# Patient Record
Sex: MALE
[De-identification: names, ages, dates, MRNs, and addresses within clinical notes are randomized per-mention and may not be internally consistent; named-entity substitution may affect disease eponyms.]

---

## 2018-10-12 ENCOUNTER — HOSPITAL ENCOUNTER (EMERGENCY)
Dept: HOSPITAL 90 - EDH | Age: 43
Discharge: HOME | End: 2018-10-12
Payer: SELF-PAY
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
## 2018-10-12 DIAGNOSIS — S62.316A: Primary | ICD-10-CM

## 2018-10-12 DIAGNOSIS — I10: ICD-10-CM

## 2018-10-12 DIAGNOSIS — E11.9: ICD-10-CM
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
## 2018-10-12 PROCEDURE — 29125 APPL SHORT ARM SPLINT STATIC: CPT

## 2018-10-12 PROCEDURE — 73130 X-RAY EXAM OF HAND: CPT

## 2020-08-10 ENCOUNTER — HOSPITAL ENCOUNTER (INPATIENT)
Dept: HOSPITAL 90 - EDH | Age: 45
LOS: 11 days | Discharge: HOME | DRG: 871 | End: 2020-08-21
Attending: INTERNAL MEDICINE | Admitting: INTERNAL MEDICINE
Payer: SELF-PAY

## 2020-08-10 VITALS — BODY MASS INDEX: 33.98 KG/M2 | HEIGHT: 72 IN | WEIGHT: 250.9 LBS

## 2020-08-10 DIAGNOSIS — N17.9: ICD-10-CM

## 2020-08-10 DIAGNOSIS — E87.1: ICD-10-CM

## 2020-08-10 DIAGNOSIS — R09.02: ICD-10-CM

## 2020-08-10 DIAGNOSIS — Z83.3: ICD-10-CM

## 2020-08-10 DIAGNOSIS — E11.65: ICD-10-CM

## 2020-08-10 DIAGNOSIS — Z82.49: ICD-10-CM

## 2020-08-10 DIAGNOSIS — E87.5: ICD-10-CM

## 2020-08-10 DIAGNOSIS — D64.9: ICD-10-CM

## 2020-08-10 DIAGNOSIS — A41.9: Primary | ICD-10-CM

## 2020-08-10 DIAGNOSIS — E78.5: ICD-10-CM

## 2020-08-10 DIAGNOSIS — R19.7: ICD-10-CM

## 2020-08-10 DIAGNOSIS — R06.03: ICD-10-CM

## 2020-08-10 DIAGNOSIS — U07.1: ICD-10-CM

## 2020-08-10 DIAGNOSIS — R65.20: ICD-10-CM

## 2020-08-10 DIAGNOSIS — E66.9: ICD-10-CM

## 2020-08-10 DIAGNOSIS — I10: ICD-10-CM

## 2020-08-10 PROCEDURE — 86850 RBC ANTIBODY SCREEN: CPT

## 2020-08-10 PROCEDURE — 85027 COMPLETE CBC AUTOMATED: CPT

## 2020-08-10 PROCEDURE — 87798 DETECT AGENT NOS DNA AMP: CPT

## 2020-08-10 PROCEDURE — 82948 REAGENT STRIP/BLOOD GLUCOSE: CPT

## 2020-08-10 PROCEDURE — 83615 LACTATE (LD) (LDH) ENZYME: CPT

## 2020-08-10 PROCEDURE — 84484 ASSAY OF TROPONIN QUANT: CPT

## 2020-08-10 PROCEDURE — 86901 BLOOD TYPING SEROLOGIC RH(D): CPT

## 2020-08-10 PROCEDURE — 87633 RESP VIRUS 12-25 TARGETS: CPT

## 2020-08-10 PROCEDURE — 83036 HEMOGLOBIN GLYCOSYLATED A1C: CPT

## 2020-08-10 PROCEDURE — 83874 ASSAY OF MYOGLOBIN: CPT

## 2020-08-10 PROCEDURE — 85610 PROTHROMBIN TIME: CPT

## 2020-08-10 PROCEDURE — 85025 COMPLETE CBC W/AUTO DIFF WBC: CPT

## 2020-08-10 PROCEDURE — 71045 X-RAY EXAM CHEST 1 VIEW: CPT

## 2020-08-10 PROCEDURE — 87486 CHLMYD PNEUM DNA AMP PROBE: CPT

## 2020-08-10 PROCEDURE — 97039 UNLISTED MODALITY: CPT

## 2020-08-10 PROCEDURE — 86900 BLOOD TYPING SEROLOGIC ABO: CPT

## 2020-08-10 PROCEDURE — 87040 BLOOD CULTURE FOR BACTERIA: CPT

## 2020-08-10 PROCEDURE — 84145 PROCALCITONIN (PCT): CPT

## 2020-08-10 PROCEDURE — 86140 C-REACTIVE PROTEIN: CPT

## 2020-08-10 PROCEDURE — 83605 ASSAY OF LACTIC ACID: CPT

## 2020-08-10 PROCEDURE — 80053 COMPREHEN METABOLIC PANEL: CPT

## 2020-08-10 PROCEDURE — 71275 CT ANGIOGRAPHY CHEST: CPT

## 2020-08-10 PROCEDURE — 87581 M.PNEUMON DNA AMP PROBE: CPT

## 2020-08-10 PROCEDURE — 85378 FIBRIN DEGRADE SEMIQUANT: CPT

## 2020-08-10 PROCEDURE — 82550 ASSAY OF CK (CPK): CPT

## 2020-08-10 PROCEDURE — 36415 COLL VENOUS BLD VENIPUNCTURE: CPT

## 2020-08-10 PROCEDURE — 87426 SARSCOV CORONAVIRUS AG IA: CPT

## 2020-08-10 PROCEDURE — 86927 PLASMA FRESH FROZEN: CPT

## 2020-08-10 PROCEDURE — 80048 BASIC METABOLIC PNL TOTAL CA: CPT

## 2020-08-10 PROCEDURE — 94760 N-INVAS EAR/PLS OXIMETRY 1: CPT

## 2020-08-10 PROCEDURE — 93005 ELECTROCARDIOGRAM TRACING: CPT

## 2020-08-10 PROCEDURE — 82728 ASSAY OF FERRITIN: CPT

## 2020-08-10 PROCEDURE — 85730 THROMBOPLASTIN TIME PARTIAL: CPT

## 2020-08-10 PROCEDURE — 36430 TRANSFUSION BLD/BLD COMPNT: CPT

## 2020-08-10 RX ADMIN — DEXAMETHASONE SODIUM PHOSPHATE SCH MG: 4 INJECTION, SOLUTION INTRAMUSCULAR; INTRAVENOUS at 22:48

## 2020-08-10 RX ADMIN — GUAIFENESIN AND DEXTROMETHORPHAN PRN ML: 100; 10 SYRUP ORAL at 23:00

## 2020-08-10 RX ADMIN — SODIUM CHLORIDE SCH GM: 9 INJECTION, SOLUTION INTRAVENOUS at 19:30

## 2020-08-10 RX ADMIN — AZITHROMYCIN MONOHYDRATE SCH MLS/HR: 500 INJECTION, POWDER, LYOPHILIZED, FOR SOLUTION INTRAVENOUS at 19:30

## 2020-08-10 RX ADMIN — SODIUM CHLORIDE SCH UNIT: 9 INJECTION, SOLUTION INTRAVENOUS at 22:47

## 2020-08-10 RX ADMIN — HEPARIN SODIUM SCH UNIT: 5000 INJECTION, SOLUTION INTRAVENOUS; SUBCUTANEOUS at 21:00

## 2020-08-10 RX ADMIN — FAMOTIDINE SCH MG: 10 INJECTION INTRAVENOUS at 21:00

## 2020-08-11 VITALS — SYSTOLIC BLOOD PRESSURE: 139 MMHG | DIASTOLIC BLOOD PRESSURE: 83 MMHG

## 2020-08-11 VITALS — SYSTOLIC BLOOD PRESSURE: 121 MMHG | DIASTOLIC BLOOD PRESSURE: 78 MMHG

## 2020-08-11 VITALS — DIASTOLIC BLOOD PRESSURE: 88 MMHG | SYSTOLIC BLOOD PRESSURE: 142 MMHG

## 2020-08-11 VITALS — SYSTOLIC BLOOD PRESSURE: 140 MMHG | DIASTOLIC BLOOD PRESSURE: 65 MMHG

## 2020-08-11 PROCEDURE — XW13325 TRANSFUSION OF CONVALESCENT PLASMA (NONAUTOLOGOUS) INTO PERIPHERAL VEIN, PERCUTANEOUS APPROACH, NEW TECHNOLOGY GROUP 5: ICD-10-PCS | Performed by: INTERNAL MEDICINE

## 2020-08-11 RX ADMIN — AZITHROMYCIN MONOHYDRATE SCH MLS/HR: 500 INJECTION, POWDER, LYOPHILIZED, FOR SOLUTION INTRAVENOUS at 18:46

## 2020-08-11 RX ADMIN — BENZONATATE PRN MG: 100 CAPSULE ORAL at 21:20

## 2020-08-11 RX ADMIN — OXYCODONE HYDROCHLORIDE AND ACETAMINOPHEN SCH MG: 500 TABLET ORAL at 08:34

## 2020-08-11 RX ADMIN — SODIUM CHLORIDE SCH GM: 9 INJECTION, SOLUTION INTRAVENOUS at 18:46

## 2020-08-11 RX ADMIN — GUAIFENESIN AND DEXTROMETHORPHAN PRN ML: 100; 10 SYRUP ORAL at 13:55

## 2020-08-11 RX ADMIN — SODIUM CHLORIDE SCH UNIT: 9 INJECTION, SOLUTION INTRAVENOUS at 11:30

## 2020-08-11 RX ADMIN — HEPARIN SODIUM SCH UNIT: 5000 INJECTION, SOLUTION INTRAVENOUS; SUBCUTANEOUS at 20:41

## 2020-08-11 RX ADMIN — ACETYLCYSTEINE SCH MG: 200 SOLUTION ORAL; RESPIRATORY (INHALATION) at 20:33

## 2020-08-11 RX ADMIN — BENZONATATE PRN MG: 100 CAPSULE ORAL at 13:55

## 2020-08-11 RX ADMIN — Medication SCH MG: at 08:34

## 2020-08-11 RX ADMIN — DEXAMETHASONE SODIUM PHOSPHATE SCH MG: 4 INJECTION, SOLUTION INTRAMUSCULAR; INTRAVENOUS at 08:37

## 2020-08-11 RX ADMIN — HEPARIN SODIUM SCH UNIT: 5000 INJECTION, SOLUTION INTRAVENOUS; SUBCUTANEOUS at 08:54

## 2020-08-11 RX ADMIN — SODIUM CHLORIDE SCH UNIT: 9 INJECTION, SOLUTION INTRAVENOUS at 16:30

## 2020-08-11 RX ADMIN — FAMOTIDINE SCH MG: 10 INJECTION INTRAVENOUS at 08:37

## 2020-08-11 RX ADMIN — SODIUM CHLORIDE SCH UNIT: 9 INJECTION, SOLUTION INTRAVENOUS at 21:52

## 2020-08-11 RX ADMIN — SODIUM CHLORIDE SCH UNIT: 9 INJECTION, SOLUTION INTRAVENOUS at 06:31

## 2020-08-11 RX ADMIN — ACETYLCYSTEINE SCH MG: 200 SOLUTION ORAL; RESPIRATORY (INHALATION) at 08:54

## 2020-08-11 RX ADMIN — FAMOTIDINE SCH MG: 10 INJECTION INTRAVENOUS at 20:33

## 2020-08-11 RX ADMIN — HEPARIN SODIUM SCH UNIT: 5000 INJECTION, SOLUTION INTRAVENOUS; SUBCUTANEOUS at 13:53

## 2020-08-11 NOTE — NUR
Patient given 1 unit of convalescent plasma on shift, tolerated well. Will continue to 
monitor for any changes.

## 2020-08-12 VITALS — DIASTOLIC BLOOD PRESSURE: 74 MMHG | SYSTOLIC BLOOD PRESSURE: 150 MMHG

## 2020-08-12 VITALS — DIASTOLIC BLOOD PRESSURE: 77 MMHG | SYSTOLIC BLOOD PRESSURE: 156 MMHG

## 2020-08-12 VITALS — SYSTOLIC BLOOD PRESSURE: 125 MMHG | DIASTOLIC BLOOD PRESSURE: 70 MMHG

## 2020-08-12 VITALS — DIASTOLIC BLOOD PRESSURE: 66 MMHG | SYSTOLIC BLOOD PRESSURE: 148 MMHG

## 2020-08-12 VITALS — SYSTOLIC BLOOD PRESSURE: 162 MMHG | DIASTOLIC BLOOD PRESSURE: 81 MMHG

## 2020-08-12 VITALS — SYSTOLIC BLOOD PRESSURE: 161 MMHG | DIASTOLIC BLOOD PRESSURE: 82 MMHG

## 2020-08-12 VITALS — SYSTOLIC BLOOD PRESSURE: 148 MMHG | DIASTOLIC BLOOD PRESSURE: 88 MMHG

## 2020-08-12 RX ADMIN — FAMOTIDINE SCH MG: 10 INJECTION INTRAVENOUS at 20:12

## 2020-08-12 RX ADMIN — OXYCODONE HYDROCHLORIDE AND ACETAMINOPHEN SCH MG: 500 TABLET ORAL at 08:40

## 2020-08-12 RX ADMIN — SODIUM CHLORIDE SCH UNIT: 9 INJECTION, SOLUTION INTRAVENOUS at 06:30

## 2020-08-12 RX ADMIN — DEXAMETHASONE SODIUM PHOSPHATE SCH MG: 4 INJECTION, SOLUTION INTRAMUSCULAR; INTRAVENOUS at 08:39

## 2020-08-12 RX ADMIN — ACETYLCYSTEINE SCH MG: 200 SOLUTION ORAL; RESPIRATORY (INHALATION) at 08:39

## 2020-08-12 RX ADMIN — HEPARIN SODIUM SCH UNIT: 5000 INJECTION, SOLUTION INTRAVENOUS; SUBCUTANEOUS at 15:28

## 2020-08-12 RX ADMIN — Medication SCH MG: at 08:40

## 2020-08-12 RX ADMIN — SODIUM CHLORIDE SCH UNIT: 9 INJECTION, SOLUTION INTRAVENOUS at 20:13

## 2020-08-12 RX ADMIN — ENALAPRIL MALEATE SCH MG: 10 TABLET ORAL at 11:44

## 2020-08-12 RX ADMIN — SODIUM CHLORIDE SCH UNIT: 9 INJECTION, SOLUTION INTRAVENOUS at 16:30

## 2020-08-12 RX ADMIN — FAMOTIDINE SCH MG: 10 INJECTION INTRAVENOUS at 08:38

## 2020-08-12 RX ADMIN — SODIUM CHLORIDE SCH UNIT: 9 INJECTION, SOLUTION INTRAVENOUS at 11:20

## 2020-08-12 RX ADMIN — HEPARIN SODIUM SCH UNIT: 5000 INJECTION, SOLUTION INTRAVENOUS; SUBCUTANEOUS at 20:13

## 2020-08-12 RX ADMIN — SODIUM CHLORIDE SCH GM: 9 INJECTION, SOLUTION INTRAVENOUS at 18:47

## 2020-08-12 RX ADMIN — ACETYLCYSTEINE SCH MG: 200 SOLUTION ORAL; RESPIRATORY (INHALATION) at 20:12

## 2020-08-12 RX ADMIN — HEPARIN SODIUM SCH UNIT: 5000 INJECTION, SOLUTION INTRAVENOUS; SUBCUTANEOUS at 08:41

## 2020-08-12 RX ADMIN — AZITHROMYCIN MONOHYDRATE SCH MLS/HR: 500 INJECTION, POWDER, LYOPHILIZED, FOR SOLUTION INTRAVENOUS at 18:47

## 2020-08-12 NOTE — NUR
DCP

CM spoke to pt discussed dc plans. Pt is independent prior to admission, lives at home with 
spouse. Denies any equipments/services. Feels safe to go back home, still drives, spouse 
able to assist with transportation and needs as necessary. As per pt brother Gael Friend JR [phone] able to assist as necessary. DC plan to home once stable. CM to 
cont to follow up.

-------------------------------------------------------------------------------

Addendum: 08/12/20 at 1740 by RANJAN BRADY LVN CM

-------------------------------------------------------------------------------

Amended: Links added.

## 2020-08-12 NOTE — NUR
NURSING ROUNDS 

PT REPORTS NOT BEING ABLE TO BREATHE FULLY. SpO2 SATS AT 92% ON 4 LPM O2. PLACED PT ON 
PARTIAL NON-REBREATHER 10 LPM @ 70%, SpO2 INCREASED TO 96-97 %. PT REPORTS THAT HE FEELS 
"MEJOR" (BETTER). INFORMED PT THAT I WOULD RETURN TO CHECK ON THE PT, ENCOURAGED TO CALL ON 
THE CALL LIGHT FOR ANY ASSISTANCE- VERBALIZED UNDERSTANDING.

## 2020-08-13 VITALS — DIASTOLIC BLOOD PRESSURE: 76 MMHG | SYSTOLIC BLOOD PRESSURE: 137 MMHG

## 2020-08-13 VITALS — DIASTOLIC BLOOD PRESSURE: 67 MMHG | SYSTOLIC BLOOD PRESSURE: 143 MMHG

## 2020-08-13 VITALS — SYSTOLIC BLOOD PRESSURE: 136 MMHG | DIASTOLIC BLOOD PRESSURE: 78 MMHG

## 2020-08-13 VITALS — DIASTOLIC BLOOD PRESSURE: 65 MMHG | SYSTOLIC BLOOD PRESSURE: 132 MMHG

## 2020-08-13 VITALS — DIASTOLIC BLOOD PRESSURE: 76 MMHG | SYSTOLIC BLOOD PRESSURE: 147 MMHG

## 2020-08-13 VITALS — SYSTOLIC BLOOD PRESSURE: 131 MMHG | DIASTOLIC BLOOD PRESSURE: 71 MMHG

## 2020-08-13 PROCEDURE — XW033E5 INTRODUCTION OF REMDESIVIR ANTI-INFECTIVE INTO PERIPHERAL VEIN, PERCUTANEOUS APPROACH, NEW TECHNOLOGY GROUP 5: ICD-10-PCS | Performed by: INTERNAL MEDICINE

## 2020-08-13 RX ADMIN — HEPARIN SODIUM SCH UNIT: 5000 INJECTION, SOLUTION INTRAVENOUS; SUBCUTANEOUS at 13:35

## 2020-08-13 RX ADMIN — ACETYLCYSTEINE SCH MG: 200 SOLUTION ORAL; RESPIRATORY (INHALATION) at 20:52

## 2020-08-13 RX ADMIN — ACETYLCYSTEINE SCH MG: 200 SOLUTION ORAL; RESPIRATORY (INHALATION) at 08:56

## 2020-08-13 RX ADMIN — SODIUM CHLORIDE SCH UNIT: 9 INJECTION, SOLUTION INTRAVENOUS at 11:36

## 2020-08-13 RX ADMIN — SODIUM CHLORIDE SCH UNIT: 9 INJECTION, SOLUTION INTRAVENOUS at 17:03

## 2020-08-13 RX ADMIN — ENALAPRIL MALEATE SCH MG: 10 TABLET ORAL at 08:57

## 2020-08-13 RX ADMIN — HEPARIN SODIUM SCH UNIT: 5000 INJECTION, SOLUTION INTRAVENOUS; SUBCUTANEOUS at 10:15

## 2020-08-13 RX ADMIN — OXYCODONE HYDROCHLORIDE AND ACETAMINOPHEN SCH MG: 500 TABLET ORAL at 08:57

## 2020-08-13 RX ADMIN — SODIUM CHLORIDE SCH UNIT: 9 INJECTION, SOLUTION INTRAVENOUS at 20:52

## 2020-08-13 RX ADMIN — SODIUM CHLORIDE SCH UNIT: 9 INJECTION, SOLUTION INTRAVENOUS at 05:14

## 2020-08-13 RX ADMIN — FAMOTIDINE SCH MG: 10 INJECTION INTRAVENOUS at 08:56

## 2020-08-13 RX ADMIN — Medication SCH MG: at 08:57

## 2020-08-13 RX ADMIN — DEXAMETHASONE SODIUM PHOSPHATE SCH MG: 4 INJECTION, SOLUTION INTRAMUSCULAR; INTRAVENOUS at 08:56

## 2020-08-13 RX ADMIN — HEPARIN SODIUM SCH UNIT: 5000 INJECTION, SOLUTION INTRAVENOUS; SUBCUTANEOUS at 20:52

## 2020-08-13 RX ADMIN — SODIUM CHLORIDE SCH GM: 9 INJECTION, SOLUTION INTRAVENOUS at 18:49

## 2020-08-13 RX ADMIN — FAMOTIDINE SCH MG: 10 INJECTION INTRAVENOUS at 20:51

## 2020-08-14 VITALS — SYSTOLIC BLOOD PRESSURE: 119 MMHG | DIASTOLIC BLOOD PRESSURE: 66 MMHG

## 2020-08-14 VITALS — DIASTOLIC BLOOD PRESSURE: 51 MMHG | SYSTOLIC BLOOD PRESSURE: 114 MMHG

## 2020-08-14 VITALS — SYSTOLIC BLOOD PRESSURE: 133 MMHG | DIASTOLIC BLOOD PRESSURE: 74 MMHG

## 2020-08-14 VITALS — DIASTOLIC BLOOD PRESSURE: 68 MMHG | SYSTOLIC BLOOD PRESSURE: 140 MMHG

## 2020-08-14 VITALS — SYSTOLIC BLOOD PRESSURE: 137 MMHG | DIASTOLIC BLOOD PRESSURE: 71 MMHG

## 2020-08-14 RX ADMIN — ACETYLCYSTEINE SCH MG: 200 SOLUTION ORAL; RESPIRATORY (INHALATION) at 08:28

## 2020-08-14 RX ADMIN — ENOXAPARIN SODIUM SCH MG: 60 INJECTION SUBCUTANEOUS at 08:40

## 2020-08-14 RX ADMIN — FAMOTIDINE SCH MG: 10 INJECTION INTRAVENOUS at 08:28

## 2020-08-14 RX ADMIN — DEXAMETHASONE SODIUM PHOSPHATE SCH MG: 4 INJECTION, SOLUTION INTRAMUSCULAR; INTRAVENOUS at 08:28

## 2020-08-14 RX ADMIN — ENALAPRIL MALEATE SCH MG: 10 TABLET ORAL at 08:27

## 2020-08-14 RX ADMIN — OXYCODONE HYDROCHLORIDE AND ACETAMINOPHEN SCH MG: 500 TABLET ORAL at 08:27

## 2020-08-14 RX ADMIN — Medication SCH MG: at 08:26

## 2020-08-14 RX ADMIN — SODIUM CHLORIDE SCH MLS/HR: 9 INJECTION, SOLUTION INTRAVENOUS at 13:13

## 2020-08-14 RX ADMIN — FAMOTIDINE SCH MG: 10 INJECTION INTRAVENOUS at 19:55

## 2020-08-14 RX ADMIN — SODIUM CHLORIDE SCH UNIT: 9 INJECTION, SOLUTION INTRAVENOUS at 04:45

## 2020-08-14 RX ADMIN — SODIUM CHLORIDE SCH UNIT: 9 INJECTION, SOLUTION INTRAVENOUS at 17:34

## 2020-08-14 RX ADMIN — ACETYLCYSTEINE SCH MG: 200 SOLUTION ORAL; RESPIRATORY (INHALATION) at 19:55

## 2020-08-14 RX ADMIN — ENOXAPARIN SODIUM SCH MG: 60 INJECTION SUBCUTANEOUS at 09:08

## 2020-08-14 RX ADMIN — SODIUM CHLORIDE SCH UNIT: 9 INJECTION, SOLUTION INTRAVENOUS at 12:24

## 2020-08-14 RX ADMIN — SODIUM CHLORIDE SCH UNIT: 9 INJECTION, SOLUTION INTRAVENOUS at 20:04

## 2020-08-15 VITALS — SYSTOLIC BLOOD PRESSURE: 112 MMHG | DIASTOLIC BLOOD PRESSURE: 60 MMHG

## 2020-08-15 VITALS — SYSTOLIC BLOOD PRESSURE: 123 MMHG | DIASTOLIC BLOOD PRESSURE: 59 MMHG

## 2020-08-15 VITALS — DIASTOLIC BLOOD PRESSURE: 64 MMHG | SYSTOLIC BLOOD PRESSURE: 121 MMHG

## 2020-08-15 VITALS — SYSTOLIC BLOOD PRESSURE: 127 MMHG | DIASTOLIC BLOOD PRESSURE: 70 MMHG

## 2020-08-15 VITALS — DIASTOLIC BLOOD PRESSURE: 59 MMHG | SYSTOLIC BLOOD PRESSURE: 97 MMHG

## 2020-08-15 VITALS — SYSTOLIC BLOOD PRESSURE: 121 MMHG | DIASTOLIC BLOOD PRESSURE: 76 MMHG

## 2020-08-15 VITALS — DIASTOLIC BLOOD PRESSURE: 70 MMHG | SYSTOLIC BLOOD PRESSURE: 128 MMHG

## 2020-08-15 RX ADMIN — ENOXAPARIN SODIUM SCH MG: 60 INJECTION SUBCUTANEOUS at 09:50

## 2020-08-15 RX ADMIN — SODIUM CHLORIDE SCH UNIT: 9 INJECTION, SOLUTION INTRAVENOUS at 06:07

## 2020-08-15 RX ADMIN — SODIUM CHLORIDE SCH UNIT: 9 INJECTION, SOLUTION INTRAVENOUS at 16:27

## 2020-08-15 RX ADMIN — FAMOTIDINE SCH MG: 10 INJECTION INTRAVENOUS at 09:48

## 2020-08-15 RX ADMIN — ACETYLCYSTEINE SCH MG: 200 SOLUTION ORAL; RESPIRATORY (INHALATION) at 09:49

## 2020-08-15 RX ADMIN — ACETYLCYSTEINE SCH MG: 200 SOLUTION ORAL; RESPIRATORY (INHALATION) at 20:15

## 2020-08-15 RX ADMIN — SODIUM CHLORIDE SCH UNIT: 9 INJECTION, SOLUTION INTRAVENOUS at 11:30

## 2020-08-15 RX ADMIN — OXYCODONE HYDROCHLORIDE AND ACETAMINOPHEN SCH MG: 500 TABLET ORAL at 09:49

## 2020-08-15 RX ADMIN — SODIUM CHLORIDE SCH MLS/HR: 9 INJECTION, SOLUTION INTRAVENOUS at 14:46

## 2020-08-15 RX ADMIN — BENZONATATE PRN MG: 100 CAPSULE ORAL at 20:16

## 2020-08-15 RX ADMIN — Medication SCH MG: at 09:49

## 2020-08-15 RX ADMIN — ENALAPRIL MALEATE SCH MG: 10 TABLET ORAL at 09:49

## 2020-08-15 RX ADMIN — SODIUM CHLORIDE SCH UNIT: 9 INJECTION, SOLUTION INTRAVENOUS at 20:16

## 2020-08-15 RX ADMIN — FAMOTIDINE SCH MG: 10 INJECTION INTRAVENOUS at 20:15

## 2020-08-15 RX ADMIN — GUAIFENESIN AND DEXTROMETHORPHAN PRN ML: 100; 10 SYRUP ORAL at 20:16

## 2020-08-15 RX ADMIN — DEXAMETHASONE SODIUM PHOSPHATE SCH MG: 4 INJECTION, SOLUTION INTRAMUSCULAR; INTRAVENOUS at 09:49

## 2020-08-16 VITALS — DIASTOLIC BLOOD PRESSURE: 85 MMHG | SYSTOLIC BLOOD PRESSURE: 130 MMHG

## 2020-08-16 VITALS — SYSTOLIC BLOOD PRESSURE: 115 MMHG | DIASTOLIC BLOOD PRESSURE: 62 MMHG

## 2020-08-16 VITALS — DIASTOLIC BLOOD PRESSURE: 64 MMHG | SYSTOLIC BLOOD PRESSURE: 108 MMHG

## 2020-08-16 VITALS — DIASTOLIC BLOOD PRESSURE: 71 MMHG | SYSTOLIC BLOOD PRESSURE: 130 MMHG

## 2020-08-16 VITALS — DIASTOLIC BLOOD PRESSURE: 68 MMHG | SYSTOLIC BLOOD PRESSURE: 107 MMHG

## 2020-08-16 RX ADMIN — SODIUM CHLORIDE SCH MLS/HR: 9 INJECTION, SOLUTION INTRAVENOUS at 14:45

## 2020-08-16 RX ADMIN — FAMOTIDINE SCH MG: 10 INJECTION INTRAVENOUS at 09:09

## 2020-08-16 RX ADMIN — DEXAMETHASONE SODIUM PHOSPHATE SCH MG: 4 INJECTION, SOLUTION INTRAMUSCULAR; INTRAVENOUS at 09:09

## 2020-08-16 RX ADMIN — SODIUM CHLORIDE SCH UNIT: 9 INJECTION, SOLUTION INTRAVENOUS at 05:42

## 2020-08-16 RX ADMIN — SODIUM CHLORIDE SCH UNIT: 9 INJECTION, SOLUTION INTRAVENOUS at 17:43

## 2020-08-16 RX ADMIN — ENOXAPARIN SODIUM SCH MG: 60 INJECTION SUBCUTANEOUS at 09:10

## 2020-08-16 RX ADMIN — Medication SCH MG: at 09:08

## 2020-08-16 RX ADMIN — ACETYLCYSTEINE SCH MG: 200 SOLUTION ORAL; RESPIRATORY (INHALATION) at 19:47

## 2020-08-16 RX ADMIN — OXYCODONE HYDROCHLORIDE AND ACETAMINOPHEN SCH MG: 500 TABLET ORAL at 09:09

## 2020-08-16 RX ADMIN — ENALAPRIL MALEATE SCH MG: 10 TABLET ORAL at 09:08

## 2020-08-16 RX ADMIN — FAMOTIDINE SCH MG: 10 INJECTION INTRAVENOUS at 19:47

## 2020-08-16 RX ADMIN — SODIUM CHLORIDE SCH UNIT: 9 INJECTION, SOLUTION INTRAVENOUS at 12:20

## 2020-08-16 RX ADMIN — ACETYLCYSTEINE SCH MG: 200 SOLUTION ORAL; RESPIRATORY (INHALATION) at 09:09

## 2020-08-16 RX ADMIN — SODIUM CHLORIDE SCH UNIT: 9 INJECTION, SOLUTION INTRAVENOUS at 19:49

## 2020-08-16 NOTE — NUR
Assessment



Overnight, the patient slept majority of the time in prone position. He is still on the 
partial Non-Rebreather with O2 maintaining above 90s. He had 2 large bowel movements. 
Patient stated that he does not have any needs or concerns at the moment. Vitals are stable 
and he is being closely monitored.

## 2020-08-17 VITALS — SYSTOLIC BLOOD PRESSURE: 119 MMHG | DIASTOLIC BLOOD PRESSURE: 67 MMHG

## 2020-08-17 VITALS — SYSTOLIC BLOOD PRESSURE: 110 MMHG | DIASTOLIC BLOOD PRESSURE: 68 MMHG

## 2020-08-17 VITALS — SYSTOLIC BLOOD PRESSURE: 112 MMHG | DIASTOLIC BLOOD PRESSURE: 67 MMHG

## 2020-08-17 VITALS — DIASTOLIC BLOOD PRESSURE: 63 MMHG | SYSTOLIC BLOOD PRESSURE: 111 MMHG

## 2020-08-17 VITALS — DIASTOLIC BLOOD PRESSURE: 74 MMHG | SYSTOLIC BLOOD PRESSURE: 120 MMHG

## 2020-08-17 VITALS — DIASTOLIC BLOOD PRESSURE: 61 MMHG | SYSTOLIC BLOOD PRESSURE: 114 MMHG

## 2020-08-17 VITALS — DIASTOLIC BLOOD PRESSURE: 53 MMHG | SYSTOLIC BLOOD PRESSURE: 100 MMHG

## 2020-08-17 PROCEDURE — XW033E5 INTRODUCTION OF REMDESIVIR ANTI-INFECTIVE INTO PERIPHERAL VEIN, PERCUTANEOUS APPROACH, NEW TECHNOLOGY GROUP 5: ICD-10-PCS | Performed by: INTERNAL MEDICINE

## 2020-08-17 RX ADMIN — SODIUM CHLORIDE SCH UNIT: 9 INJECTION, SOLUTION INTRAVENOUS at 16:50

## 2020-08-17 RX ADMIN — DEXAMETHASONE SODIUM PHOSPHATE SCH MG: 4 INJECTION, SOLUTION INTRAMUSCULAR; INTRAVENOUS at 09:58

## 2020-08-17 RX ADMIN — BENZONATATE PRN MG: 100 CAPSULE ORAL at 19:39

## 2020-08-17 RX ADMIN — OXYCODONE HYDROCHLORIDE AND ACETAMINOPHEN SCH MG: 500 TABLET ORAL at 09:57

## 2020-08-17 RX ADMIN — SODIUM CHLORIDE SCH UNIT: 9 INJECTION, SOLUTION INTRAVENOUS at 11:31

## 2020-08-17 RX ADMIN — SODIUM CHLORIDE SCH UNIT: 9 INJECTION, SOLUTION INTRAVENOUS at 05:54

## 2020-08-17 RX ADMIN — ACETYLCYSTEINE SCH MG: 200 SOLUTION ORAL; RESPIRATORY (INHALATION) at 09:57

## 2020-08-17 RX ADMIN — SODIUM CHLORIDE SCH MLS/HR: 9 INJECTION, SOLUTION INTRAVENOUS at 14:20

## 2020-08-17 RX ADMIN — ENALAPRIL MALEATE SCH MG: 10 TABLET ORAL at 09:57

## 2020-08-17 RX ADMIN — FAMOTIDINE SCH MG: 10 INJECTION INTRAVENOUS at 09:58

## 2020-08-17 RX ADMIN — ACETYLCYSTEINE SCH MG: 200 SOLUTION ORAL; RESPIRATORY (INHALATION) at 19:38

## 2020-08-17 RX ADMIN — SODIUM CHLORIDE SCH UNIT: 9 INJECTION, SOLUTION INTRAVENOUS at 19:38

## 2020-08-17 RX ADMIN — BENZONATATE PRN MG: 100 CAPSULE ORAL at 06:09

## 2020-08-17 RX ADMIN — FAMOTIDINE SCH MG: 10 INJECTION INTRAVENOUS at 19:38

## 2020-08-17 RX ADMIN — ENOXAPARIN SODIUM SCH MG: 60 INJECTION SUBCUTANEOUS at 09:58

## 2020-08-17 RX ADMIN — GUAIFENESIN AND DEXTROMETHORPHAN PRN ML: 100; 10 SYRUP ORAL at 19:39

## 2020-08-17 RX ADMIN — Medication SCH MG: at 09:56

## 2020-08-17 RX ADMIN — GUAIFENESIN AND DEXTROMETHORPHAN PRN ML: 100; 10 SYRUP ORAL at 06:09

## 2020-08-17 NOTE — NUR
Weaning O2



Last night the patient was weaned from a partial non-rebreather to a Venti-Mask fio2 50% 15 
LPM. He was tolerating the venti-mask for a couple of hours, but this morning he had to be 
replaced back onto the Non-Rebreather because his O2 sats started to drop.

## 2020-08-18 VITALS — DIASTOLIC BLOOD PRESSURE: 59 MMHG | SYSTOLIC BLOOD PRESSURE: 102 MMHG

## 2020-08-18 VITALS — SYSTOLIC BLOOD PRESSURE: 131 MMHG | DIASTOLIC BLOOD PRESSURE: 69 MMHG

## 2020-08-18 VITALS — DIASTOLIC BLOOD PRESSURE: 60 MMHG | SYSTOLIC BLOOD PRESSURE: 115 MMHG

## 2020-08-18 VITALS — DIASTOLIC BLOOD PRESSURE: 63 MMHG | SYSTOLIC BLOOD PRESSURE: 124 MMHG

## 2020-08-18 VITALS — SYSTOLIC BLOOD PRESSURE: 111 MMHG | DIASTOLIC BLOOD PRESSURE: 66 MMHG

## 2020-08-18 VITALS — DIASTOLIC BLOOD PRESSURE: 49 MMHG | SYSTOLIC BLOOD PRESSURE: 113 MMHG

## 2020-08-18 RX ADMIN — ENOXAPARIN SODIUM SCH MG: 60 INJECTION SUBCUTANEOUS at 08:30

## 2020-08-18 RX ADMIN — SODIUM CHLORIDE SCH UNIT: 9 INJECTION, SOLUTION INTRAVENOUS at 05:23

## 2020-08-18 RX ADMIN — GUAIFENESIN AND DEXTROMETHORPHAN PRN ML: 100; 10 SYRUP ORAL at 03:56

## 2020-08-18 RX ADMIN — BENZONATATE PRN MG: 100 CAPSULE ORAL at 03:56

## 2020-08-18 RX ADMIN — ENALAPRIL MALEATE SCH MG: 10 TABLET ORAL at 08:27

## 2020-08-18 RX ADMIN — SODIUM CHLORIDE SCH UNIT: 9 INJECTION, SOLUTION INTRAVENOUS at 12:23

## 2020-08-18 RX ADMIN — GUAIFENESIN AND DEXTROMETHORPHAN PRN ML: 100; 10 SYRUP ORAL at 19:58

## 2020-08-18 RX ADMIN — OXYCODONE HYDROCHLORIDE AND ACETAMINOPHEN SCH MG: 500 TABLET ORAL at 08:27

## 2020-08-18 RX ADMIN — FAMOTIDINE SCH MG: 10 INJECTION INTRAVENOUS at 19:58

## 2020-08-18 RX ADMIN — SODIUM CHLORIDE SCH UNIT: 9 INJECTION, SOLUTION INTRAVENOUS at 16:40

## 2020-08-18 RX ADMIN — ACETYLCYSTEINE SCH MG: 200 SOLUTION ORAL; RESPIRATORY (INHALATION) at 19:58

## 2020-08-18 RX ADMIN — Medication SCH MG: at 08:26

## 2020-08-18 RX ADMIN — BENZONATATE PRN MG: 100 CAPSULE ORAL at 19:58

## 2020-08-18 RX ADMIN — SODIUM CHLORIDE SCH UNIT: 9 INJECTION, SOLUTION INTRAVENOUS at 19:59

## 2020-08-18 RX ADMIN — ACETYLCYSTEINE SCH MG: 200 SOLUTION ORAL; RESPIRATORY (INHALATION) at 08:27

## 2020-08-18 RX ADMIN — DEXAMETHASONE SODIUM PHOSPHATE SCH MG: 4 INJECTION, SOLUTION INTRAMUSCULAR; INTRAVENOUS at 08:27

## 2020-08-18 RX ADMIN — FAMOTIDINE SCH MG: 10 INJECTION INTRAVENOUS at 08:27

## 2020-08-19 VITALS — SYSTOLIC BLOOD PRESSURE: 92 MMHG | DIASTOLIC BLOOD PRESSURE: 50 MMHG

## 2020-08-19 VITALS — DIASTOLIC BLOOD PRESSURE: 58 MMHG | SYSTOLIC BLOOD PRESSURE: 125 MMHG

## 2020-08-19 VITALS — DIASTOLIC BLOOD PRESSURE: 66 MMHG | SYSTOLIC BLOOD PRESSURE: 106 MMHG

## 2020-08-19 VITALS — SYSTOLIC BLOOD PRESSURE: 127 MMHG | DIASTOLIC BLOOD PRESSURE: 70 MMHG

## 2020-08-19 VITALS — SYSTOLIC BLOOD PRESSURE: 124 MMHG | DIASTOLIC BLOOD PRESSURE: 73 MMHG

## 2020-08-19 VITALS — SYSTOLIC BLOOD PRESSURE: 119 MMHG | DIASTOLIC BLOOD PRESSURE: 61 MMHG

## 2020-08-19 RX ADMIN — ENALAPRIL MALEATE SCH MG: 10 TABLET ORAL at 08:29

## 2020-08-19 RX ADMIN — OXYCODONE HYDROCHLORIDE AND ACETAMINOPHEN SCH MG: 500 TABLET ORAL at 08:29

## 2020-08-19 RX ADMIN — SODIUM CHLORIDE SCH UNIT: 9 INJECTION, SOLUTION INTRAVENOUS at 15:43

## 2020-08-19 RX ADMIN — GUAIFENESIN AND DEXTROMETHORPHAN PRN ML: 100; 10 SYRUP ORAL at 20:14

## 2020-08-19 RX ADMIN — DEXAMETHASONE SODIUM PHOSPHATE SCH MG: 4 INJECTION, SOLUTION INTRAMUSCULAR; INTRAVENOUS at 08:29

## 2020-08-19 RX ADMIN — FAMOTIDINE SCH MG: 10 INJECTION INTRAVENOUS at 20:12

## 2020-08-19 RX ADMIN — BENZONATATE PRN MG: 100 CAPSULE ORAL at 20:14

## 2020-08-19 RX ADMIN — ENOXAPARIN SODIUM SCH MG: 60 INJECTION SUBCUTANEOUS at 08:31

## 2020-08-19 RX ADMIN — SODIUM CHLORIDE SCH UNIT: 9 INJECTION, SOLUTION INTRAVENOUS at 08:40

## 2020-08-19 RX ADMIN — ACETYLCYSTEINE SCH MG: 200 SOLUTION ORAL; RESPIRATORY (INHALATION) at 20:12

## 2020-08-19 RX ADMIN — SODIUM CHLORIDE SCH UNIT: 9 INJECTION, SOLUTION INTRAVENOUS at 20:13

## 2020-08-19 RX ADMIN — FAMOTIDINE SCH MG: 10 INJECTION INTRAVENOUS at 08:29

## 2020-08-19 RX ADMIN — SODIUM CHLORIDE SCH UNIT: 9 INJECTION, SOLUTION INTRAVENOUS at 12:43

## 2020-08-19 RX ADMIN — ACETYLCYSTEINE SCH MG: 200 SOLUTION ORAL; RESPIRATORY (INHALATION) at 08:30

## 2020-08-19 RX ADMIN — Medication SCH MG: at 08:29

## 2020-08-19 NOTE — NUR
RDSCREEN - LOS X 9



Pt admitted with COVID-19 PNA. History of DM and HTN. Pt tolerating Heart Healthy, 75gm CC 
Diet order with no report of GI distress. Good PO intake at 100%. . Alb 2.8. Obesity 
Class I. Zinc, Vitamin C, DM medications in place.  Acute hypoxemic respiratory distress.



Recommend modify to 60gm CCD

Recommend 60mL ProMod QD



RD to continue to monitor. Please notify as additional nutrition concerns arise. Thank you. 

-------------------------------------------------------------------------------

Addendum: 08/19/20 at 1323 by REEMA YOUNG RD RD

-------------------------------------------------------------------------------

Amended: Links added.

## 2020-08-20 VITALS — DIASTOLIC BLOOD PRESSURE: 67 MMHG | SYSTOLIC BLOOD PRESSURE: 123 MMHG

## 2020-08-20 VITALS — SYSTOLIC BLOOD PRESSURE: 108 MMHG | DIASTOLIC BLOOD PRESSURE: 67 MMHG

## 2020-08-20 VITALS — SYSTOLIC BLOOD PRESSURE: 121 MMHG | DIASTOLIC BLOOD PRESSURE: 61 MMHG

## 2020-08-20 VITALS — DIASTOLIC BLOOD PRESSURE: 66 MMHG | SYSTOLIC BLOOD PRESSURE: 99 MMHG

## 2020-08-20 VITALS — DIASTOLIC BLOOD PRESSURE: 69 MMHG | SYSTOLIC BLOOD PRESSURE: 114 MMHG

## 2020-08-20 VITALS — DIASTOLIC BLOOD PRESSURE: 59 MMHG | SYSTOLIC BLOOD PRESSURE: 106 MMHG

## 2020-08-20 RX ADMIN — ACETYLCYSTEINE SCH MG: 200 SOLUTION ORAL; RESPIRATORY (INHALATION) at 19:43

## 2020-08-20 RX ADMIN — SODIUM CHLORIDE SCH UNIT: 9 INJECTION, SOLUTION INTRAVENOUS at 05:37

## 2020-08-20 RX ADMIN — ENOXAPARIN SODIUM SCH MG: 60 INJECTION SUBCUTANEOUS at 08:12

## 2020-08-20 RX ADMIN — GUAIFENESIN AND DEXTROMETHORPHAN PRN ML: 100; 10 SYRUP ORAL at 19:49

## 2020-08-20 RX ADMIN — SODIUM CHLORIDE SCH UNIT: 9 INJECTION, SOLUTION INTRAVENOUS at 11:25

## 2020-08-20 RX ADMIN — SODIUM CHLORIDE SCH UNIT: 9 INJECTION, SOLUTION INTRAVENOUS at 15:39

## 2020-08-20 RX ADMIN — ENALAPRIL MALEATE SCH MG: 10 TABLET ORAL at 08:11

## 2020-08-20 RX ADMIN — SODIUM CHLORIDE SCH UNIT: 9 INJECTION, SOLUTION INTRAVENOUS at 15:41

## 2020-08-20 RX ADMIN — Medication SCH MG: at 08:11

## 2020-08-20 RX ADMIN — DEXAMETHASONE SODIUM PHOSPHATE SCH MG: 4 INJECTION, SOLUTION INTRAMUSCULAR; INTRAVENOUS at 08:10

## 2020-08-20 RX ADMIN — ACETYLCYSTEINE SCH MG: 200 SOLUTION ORAL; RESPIRATORY (INHALATION) at 08:10

## 2020-08-20 RX ADMIN — OXYCODONE HYDROCHLORIDE AND ACETAMINOPHEN SCH MG: 500 TABLET ORAL at 08:11

## 2020-08-20 RX ADMIN — BENZONATATE PRN MG: 100 CAPSULE ORAL at 19:49

## 2020-08-20 RX ADMIN — SODIUM CHLORIDE SCH UNIT: 9 INJECTION, SOLUTION INTRAVENOUS at 19:56

## 2020-08-20 RX ADMIN — FAMOTIDINE SCH MG: 10 INJECTION INTRAVENOUS at 19:43

## 2020-08-20 RX ADMIN — FAMOTIDINE SCH MG: 10 INJECTION INTRAVENOUS at 08:09

## 2020-08-20 NOTE — NUR
CM Note: [facility]

CM spoke to pt, discussed regarding Alternative [facility] w/[facility] [place] as patient 
continues w/nonrebreather 15L w/O2 Sat 945, MD agreeable for transfer if patient qualify. 
Answered questions. Patient aware ACS is free, no charge to patient. Patient verbalized he 
would like to talk to his wife first before deciding, requested for CM to call back 
tomorrow. Dr Alonzo made aware. CM to cont to follow up.

## 2020-08-21 VITALS — SYSTOLIC BLOOD PRESSURE: 116 MMHG | DIASTOLIC BLOOD PRESSURE: 58 MMHG

## 2020-08-21 VITALS — DIASTOLIC BLOOD PRESSURE: 68 MMHG | SYSTOLIC BLOOD PRESSURE: 109 MMHG

## 2020-08-21 VITALS — SYSTOLIC BLOOD PRESSURE: 113 MMHG | DIASTOLIC BLOOD PRESSURE: 65 MMHG

## 2020-08-21 RX ADMIN — SODIUM CHLORIDE SCH UNIT: 9 INJECTION, SOLUTION INTRAVENOUS at 12:21

## 2020-08-21 RX ADMIN — DEXAMETHASONE SODIUM PHOSPHATE SCH MG: 4 INJECTION, SOLUTION INTRAMUSCULAR; INTRAVENOUS at 08:01

## 2020-08-21 RX ADMIN — SODIUM CHLORIDE SCH UNIT: 9 INJECTION, SOLUTION INTRAVENOUS at 16:28

## 2020-08-21 RX ADMIN — SODIUM CHLORIDE SCH UNIT: 9 INJECTION, SOLUTION INTRAVENOUS at 12:20

## 2020-08-21 RX ADMIN — Medication SCH MG: at 08:02

## 2020-08-21 RX ADMIN — ACETYLCYSTEINE SCH MG: 200 SOLUTION ORAL; RESPIRATORY (INHALATION) at 08:01

## 2020-08-21 RX ADMIN — SODIUM CHLORIDE SCH UNIT: 9 INJECTION, SOLUTION INTRAVENOUS at 05:34

## 2020-08-21 RX ADMIN — ENOXAPARIN SODIUM SCH MG: 60 INJECTION SUBCUTANEOUS at 08:03

## 2020-08-21 RX ADMIN — OXYCODONE HYDROCHLORIDE AND ACETAMINOPHEN SCH MG: 500 TABLET ORAL at 08:02

## 2020-08-21 RX ADMIN — FAMOTIDINE SCH MG: 10 INJECTION INTRAVENOUS at 08:01

## 2020-08-21 RX ADMIN — ENALAPRIL MALEATE SCH MG: 10 TABLET ORAL at 08:02

## 2020-08-21 NOTE — NUR
discharge notes



Pt d/c home, on room air. vss. iv removed. d/c with prescription and d/c instruction. tele 
pack removed and return. home with privte vehicle and son.

## 2020-08-24 ENCOUNTER — HOSPITAL ENCOUNTER (INPATIENT)
Dept: HOSPITAL 90 - EDH | Age: 45
LOS: 3 days | Discharge: HOME | DRG: 177 | End: 2020-08-27
Attending: INTERNAL MEDICINE | Admitting: INTERNAL MEDICINE
Payer: COMMERCIAL

## 2020-08-24 VITALS — WEIGHT: 141.8 LBS | BODY MASS INDEX: 19.21 KG/M2 | HEIGHT: 72 IN

## 2020-08-24 DIAGNOSIS — Z83.3: ICD-10-CM

## 2020-08-24 DIAGNOSIS — J12.89: ICD-10-CM

## 2020-08-24 DIAGNOSIS — J96.01: ICD-10-CM

## 2020-08-24 DIAGNOSIS — U07.1: Primary | ICD-10-CM

## 2020-08-24 DIAGNOSIS — I10: ICD-10-CM

## 2020-08-24 DIAGNOSIS — E11.65: ICD-10-CM

## 2020-08-24 DIAGNOSIS — Z91.19: ICD-10-CM

## 2020-08-24 DIAGNOSIS — E87.1: ICD-10-CM

## 2020-08-24 DIAGNOSIS — I45.10: ICD-10-CM

## 2020-08-24 DIAGNOSIS — E78.5: ICD-10-CM

## 2020-08-24 DIAGNOSIS — Z82.49: ICD-10-CM

## 2020-08-24 LAB
ALBUMIN SERPL-MCNC: 3.6 G/DL (ref 3.5–5)
ALT SERPL-CCNC: 54 U/L (ref 12–78)
APTT PPP: 30.2 SEC (ref 26.3–35.5)
AST SERPL-CCNC: 19 U/L (ref 10–37)
BASE EXCESS BLDA CALC-SCNC: 0.3 MMOL/L (ref -2–3)
BASOPHILS NFR BLD AUTO: 0.2 % (ref 0–5)
BILIRUB SERPL-MCNC: 0.4 MG/DL (ref 0.2–1)
BUN SERPL-MCNC: 26 MG/DL (ref 7–18)
CHLORIDE SERPL-SCNC: 93 MMOL/L (ref 101–111)
CK SERPL-CCNC: 23 U/L (ref 21–232)
CO2 SERPL-SCNC: 26 MMOL/L (ref 21–32)
CREAT SERPL-MCNC: 1.4 MG/DL (ref 0.5–1.5)
CRP SERPL HS-MCNC: 2.1 MG/L (ref 0–9)
EOSINOPHIL NFR BLD AUTO: 0 % (ref 0–8)
ERYTHROCYTE [DISTWIDTH] IN BLOOD BY AUTOMATED COUNT: 12.3 % (ref 11–15.5)
GFR SERPL CREATININE-BSD FRML MDRD: 58 ML/MIN (ref 60–?)
GLUCOSE SERPL-MCNC: 346 MG/DL (ref 70–105)
HCO3 BLDA-SCNC: 24 MMOL/L (ref 21–28)
HCT VFR BLD AUTO: 41.2 % (ref 42–54)
INR PPP: 0.95 (ref 0.85–1.15)
LYMPHOCYTES NFR SPEC AUTO: 12.6 % (ref 21–51)
MCH RBC QN AUTO: 29.2 PG (ref 27–33)
MCHC RBC AUTO-ENTMCNC: 34.7 G/DL (ref 32–36)
MCV RBC AUTO: 84.1 FL (ref 79–99)
MONOCYTES NFR BLD AUTO: 6.4 % (ref 3–13)
NEUTROPHILS NFR BLD AUTO: 79.4 % (ref 40–77)
NRBC BLD MANUAL-RTO: 0 % (ref 0–0.19)
PCO2 BLDA: 36 MMHG (ref 35–48)
PLATELET # BLD AUTO: 514 K/UL (ref 130–400)
POTASSIUM SERPL-SCNC: 4.9 MMOL/L (ref 3.5–5.1)
PROT SERPL-MCNC: 8.1 G/DL (ref 6–8.3)
PROTHROMBIN TIME: 10.3 SEC (ref 9.6–11.6)
RBC # BLD AUTO: 4.9 MIL/UL (ref 4.5–6.2)
SAO2 % BLDA: 95 % (ref 95–99)
SODIUM SERPL-SCNC: 129 MMOL/L (ref 136–145)
WBC # BLD AUTO: 14.8 K/UL (ref 4.8–10.8)

## 2020-08-24 PROCEDURE — 93005 ELECTROCARDIOGRAM TRACING: CPT

## 2020-08-24 PROCEDURE — 71045 X-RAY EXAM CHEST 1 VIEW: CPT

## 2020-08-24 PROCEDURE — 84484 ASSAY OF TROPONIN QUANT: CPT

## 2020-08-24 PROCEDURE — 80053 COMPREHEN METABOLIC PANEL: CPT

## 2020-08-24 PROCEDURE — 85610 PROTHROMBIN TIME: CPT

## 2020-08-24 PROCEDURE — 82948 REAGENT STRIP/BLOOD GLUCOSE: CPT

## 2020-08-24 PROCEDURE — 81003 URINALYSIS AUTO W/O SCOPE: CPT

## 2020-08-24 PROCEDURE — 36600 WITHDRAWAL OF ARTERIAL BLOOD: CPT

## 2020-08-24 PROCEDURE — 83605 ASSAY OF LACTIC ACID: CPT

## 2020-08-24 PROCEDURE — 86900 BLOOD TYPING SEROLOGIC ABO: CPT

## 2020-08-24 PROCEDURE — 83615 LACTATE (LD) (LDH) ENZYME: CPT

## 2020-08-24 PROCEDURE — 84145 PROCALCITONIN (PCT): CPT

## 2020-08-24 PROCEDURE — 71275 CT ANGIOGRAPHY CHEST: CPT

## 2020-08-24 PROCEDURE — 87040 BLOOD CULTURE FOR BACTERIA: CPT

## 2020-08-24 PROCEDURE — 85378 FIBRIN DEGRADE SEMIQUANT: CPT

## 2020-08-24 PROCEDURE — 36415 COLL VENOUS BLD VENIPUNCTURE: CPT

## 2020-08-24 PROCEDURE — 82728 ASSAY OF FERRITIN: CPT

## 2020-08-24 PROCEDURE — 99291 CRITICAL CARE FIRST HOUR: CPT

## 2020-08-24 PROCEDURE — 83880 ASSAY OF NATRIURETIC PEPTIDE: CPT

## 2020-08-24 PROCEDURE — 85025 COMPLETE CBC W/AUTO DIFF WBC: CPT

## 2020-08-24 PROCEDURE — 70450 CT HEAD/BRAIN W/O DYE: CPT

## 2020-08-24 PROCEDURE — 86901 BLOOD TYPING SEROLOGIC RH(D): CPT

## 2020-08-24 PROCEDURE — 83036 HEMOGLOBIN GLYCOSYLATED A1C: CPT

## 2020-08-24 PROCEDURE — 82803 BLOOD GASES ANY COMBINATION: CPT

## 2020-08-24 PROCEDURE — 86927 PLASMA FRESH FROZEN: CPT

## 2020-08-24 PROCEDURE — 82550 ASSAY OF CK (CPK): CPT

## 2020-08-24 PROCEDURE — 86140 C-REACTIVE PROTEIN: CPT

## 2020-08-24 PROCEDURE — 36430 TRANSFUSION BLD/BLD COMPNT: CPT

## 2020-08-24 PROCEDURE — 85730 THROMBOPLASTIN TIME PARTIAL: CPT

## 2020-08-25 VITALS — DIASTOLIC BLOOD PRESSURE: 90 MMHG | SYSTOLIC BLOOD PRESSURE: 137 MMHG

## 2020-08-25 VITALS — SYSTOLIC BLOOD PRESSURE: 128 MMHG | DIASTOLIC BLOOD PRESSURE: 76 MMHG

## 2020-08-25 VITALS — DIASTOLIC BLOOD PRESSURE: 87 MMHG | SYSTOLIC BLOOD PRESSURE: 128 MMHG

## 2020-08-25 VITALS — DIASTOLIC BLOOD PRESSURE: 84 MMHG | SYSTOLIC BLOOD PRESSURE: 155 MMHG

## 2020-08-25 VITALS — SYSTOLIC BLOOD PRESSURE: 125 MMHG | DIASTOLIC BLOOD PRESSURE: 64 MMHG

## 2020-08-25 VITALS — SYSTOLIC BLOOD PRESSURE: 125 MMHG | DIASTOLIC BLOOD PRESSURE: 66 MMHG

## 2020-08-25 LAB
ALBUMIN SERPL-MCNC: 3.2 G/DL (ref 3.5–5)
ALT SERPL-CCNC: 50 U/L (ref 12–78)
AST SERPL-CCNC: 16 U/L (ref 10–37)
BASOPHILS NFR BLD AUTO: 0.2 % (ref 0–5)
BILIRUB SERPL-MCNC: 0.4 MG/DL (ref 0.2–1)
BUN SERPL-MCNC: 21 MG/DL (ref 7–18)
CHLORIDE SERPL-SCNC: 96 MMOL/L (ref 101–111)
CO2 SERPL-SCNC: 30 MMOL/L (ref 21–32)
CREAT SERPL-MCNC: 1.1 MG/DL (ref 0.5–1.5)
CRP SERPL HS-MCNC: 2.4 MG/L (ref 0–9)
EOSINOPHIL NFR BLD AUTO: 0 % (ref 0–8)
ERYTHROCYTE [DISTWIDTH] IN BLOOD BY AUTOMATED COUNT: 12.4 % (ref 11–15.5)
GFR SERPL CREATININE-BSD FRML MDRD: 77 ML/MIN (ref 60–?)
GLUCOSE SERPL-MCNC: 235 MG/DL (ref 70–105)
HBA1C MFR BLD: 9.5 % (ref 4–6)
HCT VFR BLD AUTO: 37.4 % (ref 42–54)
LDH SERPL-CCNC: 169 U/L (ref 81–234)
LYMPHOCYTES NFR SPEC AUTO: 11.5 % (ref 21–51)
MCH RBC QN AUTO: 29.1 PG (ref 27–33)
MCHC RBC AUTO-ENTMCNC: 34.8 G/DL (ref 32–36)
MCV RBC AUTO: 83.7 FL (ref 79–99)
MONOCYTES NFR BLD AUTO: 5.2 % (ref 3–13)
NEUTROPHILS NFR BLD AUTO: 81.8 % (ref 40–77)
NRBC BLD MANUAL-RTO: 0 % (ref 0–0.19)
PH UR STRIP: 6 [PH] (ref 5–8)
PLATELET # BLD AUTO: 507 K/UL (ref 130–400)
POTASSIUM SERPL-SCNC: 4.6 MMOL/L (ref 3.5–5.1)
PROT SERPL-MCNC: 7.3 G/DL (ref 6–8.3)
RBC # BLD AUTO: 4.47 MIL/UL (ref 4.5–6.2)
SODIUM SERPL-SCNC: 132 MMOL/L (ref 136–145)
SP GR UR STRIP: 1.01 (ref 1–1.03)
UROBILINOGEN UR STRIP-MCNC: 0.2 MG/DL (ref 0.2–1)
WBC # BLD AUTO: 13.3 K/UL (ref 4.8–10.8)

## 2020-08-25 PROCEDURE — XW13325 TRANSFUSION OF CONVALESCENT PLASMA (NONAUTOLOGOUS) INTO PERIPHERAL VEIN, PERCUTANEOUS APPROACH, NEW TECHNOLOGY GROUP 5: ICD-10-PCS | Performed by: INTERNAL MEDICINE

## 2020-08-25 RX ADMIN — APIXABAN SCH MG: 2.5 TABLET, FILM COATED ORAL at 20:38

## 2020-08-25 RX ADMIN — FAMOTIDINE SCH MG: 10 INJECTION INTRAVENOUS at 20:37

## 2020-08-25 RX ADMIN — SODIUM CHLORIDE SCH UNIT: 9 INJECTION, SOLUTION INTRAVENOUS at 20:37

## 2020-08-25 RX ADMIN — Medication SCH MG: at 09:14

## 2020-08-25 RX ADMIN — SODIUM CHLORIDE SCH UNIT: 9 INJECTION, SOLUTION INTRAVENOUS at 06:50

## 2020-08-25 RX ADMIN — SODIUM CHLORIDE SCH UNIT: 9 INJECTION, SOLUTION INTRAVENOUS at 12:23

## 2020-08-25 RX ADMIN — INSULIN GLARGINE SCH UNITS: 100 INJECTION, SOLUTION SUBCUTANEOUS at 20:35

## 2020-08-25 RX ADMIN — SODIUM CHLORIDE SCH UNIT: 9 INJECTION, SOLUTION INTRAVENOUS at 16:55

## 2020-08-25 RX ADMIN — SODIUM CHLORIDE SCH UNIT: 9 INJECTION, SOLUTION INTRAVENOUS at 16:12

## 2020-08-25 RX ADMIN — OXYCODONE HYDROCHLORIDE AND ACETAMINOPHEN SCH MG: 500 TABLET ORAL at 09:13

## 2020-08-25 RX ADMIN — FAMOTIDINE SCH MG: 10 INJECTION INTRAVENOUS at 09:14

## 2020-08-25 NOTE — NUR
Patient received from ED accompanied nurse to room 411.  Patient assisted to bed and made 
comfortable.

Oriented to room and call light system.  Assessment and nursing data base completed. 

Heparin drip started as ordered per protocol. 17u/kg/hr.  Now infusing at 19.04ml/hr. 

No acute distress at this time, pt is in stable condition. Following POC.

## 2020-08-25 NOTE — NUR
DCP

CM spoke to pt discussed dc plans. Pt is independent prior to admission, lives at home with 
spouse and son. Denies any equipments/services. Feels safe to go back home, still drives and 
works, spouse and son able to assist with transportation and needs as necessary. Pt is a 
readmission, was dc'd home recently. CM explained [facility] last admissoin, but pt wanted to 
talk to spouse first and decided to go home after. Informed pt of the ACS, at this time pt 
does not want to decide yet and wanted to feel better first. DC plan to home once stable. CM 
to cont to follow up.

-------------------------------------------------------------------------------

Addendum: 08/25/20 at 1046 by RANJAN BRADY LVN CM

-------------------------------------------------------------------------------

Amended: Links added.

## 2020-08-26 VITALS — DIASTOLIC BLOOD PRESSURE: 78 MMHG | SYSTOLIC BLOOD PRESSURE: 140 MMHG

## 2020-08-26 VITALS — DIASTOLIC BLOOD PRESSURE: 80 MMHG | SYSTOLIC BLOOD PRESSURE: 21 MMHG

## 2020-08-26 VITALS — DIASTOLIC BLOOD PRESSURE: 75 MMHG | SYSTOLIC BLOOD PRESSURE: 137 MMHG

## 2020-08-26 VITALS — SYSTOLIC BLOOD PRESSURE: 141 MMHG | DIASTOLIC BLOOD PRESSURE: 86 MMHG

## 2020-08-26 VITALS — SYSTOLIC BLOOD PRESSURE: 133 MMHG | DIASTOLIC BLOOD PRESSURE: 79 MMHG

## 2020-08-26 VITALS — SYSTOLIC BLOOD PRESSURE: 139 MMHG | DIASTOLIC BLOOD PRESSURE: 69 MMHG

## 2020-08-26 LAB
ALBUMIN SERPL-MCNC: 3.3 G/DL (ref 3.5–5)
ALT SERPL-CCNC: 61 U/L (ref 12–78)
AST SERPL-CCNC: 21 U/L (ref 10–37)
BASOPHILS NFR BLD AUTO: 0.3 % (ref 0–5)
BILIRUB SERPL-MCNC: 0.5 MG/DL (ref 0.2–1)
BUN SERPL-MCNC: 26 MG/DL (ref 7–18)
CHLORIDE SERPL-SCNC: 97 MMOL/L (ref 101–111)
CO2 SERPL-SCNC: 30 MMOL/L (ref 21–32)
CREAT SERPL-MCNC: 1.1 MG/DL (ref 0.5–1.5)
CRP SERPL HS-MCNC: < 2 MG/L (ref 0–9)
EOSINOPHIL NFR BLD AUTO: 0.1 % (ref 0–8)
ERYTHROCYTE [DISTWIDTH] IN BLOOD BY AUTOMATED COUNT: 12.4 % (ref 11–15.5)
GFR SERPL CREATININE-BSD FRML MDRD: 77 ML/MIN (ref 60–?)
GLUCOSE SERPL-MCNC: 186 MG/DL (ref 70–105)
HCT VFR BLD AUTO: 39.6 % (ref 42–54)
LDH SERPL-CCNC: 185 U/L (ref 81–234)
LYMPHOCYTES NFR SPEC AUTO: 12.6 % (ref 21–51)
MCH RBC QN AUTO: 29.1 PG (ref 27–33)
MCHC RBC AUTO-ENTMCNC: 33.8 G/DL (ref 32–36)
MCV RBC AUTO: 86.1 FL (ref 79–99)
MONOCYTES NFR BLD AUTO: 2.4 % (ref 3–13)
NEUTROPHILS NFR BLD AUTO: 83.9 % (ref 40–77)
NRBC BLD MANUAL-RTO: 0 % (ref 0–0.19)
PLATELET # BLD AUTO: 425 K/UL (ref 130–400)
POTASSIUM SERPL-SCNC: 4.8 MMOL/L (ref 3.5–5.1)
PROT SERPL-MCNC: 7.5 G/DL (ref 6–8.3)
RBC # BLD AUTO: 4.6 MIL/UL (ref 4.5–6.2)
SODIUM SERPL-SCNC: 134 MMOL/L (ref 136–145)
WBC # BLD AUTO: 9.4 K/UL (ref 4.8–10.8)

## 2020-08-26 RX ADMIN — SODIUM CHLORIDE SCH UNIT: 9 INJECTION, SOLUTION INTRAVENOUS at 06:31

## 2020-08-26 RX ADMIN — SODIUM CHLORIDE SCH UNIT: 9 INJECTION, SOLUTION INTRAVENOUS at 16:21

## 2020-08-26 RX ADMIN — SODIUM CHLORIDE SCH UNIT: 9 INJECTION, SOLUTION INTRAVENOUS at 08:39

## 2020-08-26 RX ADMIN — SODIUM CHLORIDE SCH UNIT: 9 INJECTION, SOLUTION INTRAVENOUS at 16:20

## 2020-08-26 RX ADMIN — APIXABAN SCH MG: 2.5 TABLET, FILM COATED ORAL at 08:39

## 2020-08-26 RX ADMIN — OXYCODONE HYDROCHLORIDE AND ACETAMINOPHEN SCH MG: 500 TABLET ORAL at 08:40

## 2020-08-26 RX ADMIN — Medication SCH MG: at 08:40

## 2020-08-26 RX ADMIN — DEXAMETHASONE SODIUM PHOSPHATE SCH MG: 4 INJECTION, SOLUTION INTRAMUSCULAR; INTRAVENOUS at 00:46

## 2020-08-26 RX ADMIN — INSULIN GLARGINE SCH UNITS: 100 INJECTION, SOLUTION SUBCUTANEOUS at 20:21

## 2020-08-26 RX ADMIN — SODIUM CHLORIDE SCH UNIT: 9 INJECTION, SOLUTION INTRAVENOUS at 20:21

## 2020-08-26 RX ADMIN — FAMOTIDINE SCH MG: 10 INJECTION INTRAVENOUS at 20:18

## 2020-08-26 RX ADMIN — SODIUM CHLORIDE SCH UNIT: 9 INJECTION, SOLUTION INTRAVENOUS at 11:22

## 2020-08-26 RX ADMIN — APIXABAN SCH MG: 2.5 TABLET, FILM COATED ORAL at 20:18

## 2020-08-26 RX ADMIN — FAMOTIDINE SCH MG: 10 INJECTION INTRAVENOUS at 08:39

## 2020-08-26 RX ADMIN — SODIUM CHLORIDE SCH UNIT: 9 INJECTION, SOLUTION INTRAVENOUS at 11:23

## 2020-08-26 NOTE — NUR
Pharmacy profile reviewed



Nurse notified pharmacy that per Dr. Elizabeth the patient is does not qualify for Remedesivir.

## 2020-08-27 VITALS — DIASTOLIC BLOOD PRESSURE: 82 MMHG | SYSTOLIC BLOOD PRESSURE: 131 MMHG

## 2020-08-27 VITALS — DIASTOLIC BLOOD PRESSURE: 84 MMHG | SYSTOLIC BLOOD PRESSURE: 138 MMHG

## 2020-08-27 VITALS — DIASTOLIC BLOOD PRESSURE: 74 MMHG | SYSTOLIC BLOOD PRESSURE: 142 MMHG

## 2020-08-27 LAB
ALBUMIN SERPL-MCNC: 3.6 G/DL (ref 3.5–5)
ALT SERPL-CCNC: 77 U/L (ref 12–78)
AST SERPL-CCNC: 21 U/L (ref 10–37)
BASOPHILS NFR BLD AUTO: 0.2 % (ref 0–5)
BILIRUB SERPL-MCNC: 0.6 MG/DL (ref 0.2–1)
BUN SERPL-MCNC: 28 MG/DL (ref 7–18)
CHLORIDE SERPL-SCNC: 94 MMOL/L (ref 101–111)
CO2 SERPL-SCNC: 29 MMOL/L (ref 21–32)
CREAT SERPL-MCNC: 1.3 MG/DL (ref 0.5–1.5)
CRP SERPL HS-MCNC: 10.1 MG/L (ref 0–9)
EOSINOPHIL NFR BLD AUTO: 0.1 % (ref 0–8)
ERYTHROCYTE [DISTWIDTH] IN BLOOD BY AUTOMATED COUNT: 12.7 % (ref 11–15.5)
GFR SERPL CREATININE-BSD FRML MDRD: 63 ML/MIN (ref 60–?)
GLUCOSE SERPL-MCNC: 250 MG/DL (ref 70–105)
HCT VFR BLD AUTO: 39.1 % (ref 42–54)
LDH SERPL-CCNC: 202 U/L (ref 81–234)
LYMPHOCYTES NFR SPEC AUTO: 13.8 % (ref 21–51)
MCH RBC QN AUTO: 29.1 PG (ref 27–33)
MCHC RBC AUTO-ENTMCNC: 34.3 G/DL (ref 32–36)
MCV RBC AUTO: 85 FL (ref 79–99)
MONOCYTES NFR BLD AUTO: 3 % (ref 3–13)
NEUTROPHILS NFR BLD AUTO: 81.7 % (ref 40–77)
NRBC BLD MANUAL-RTO: 0 % (ref 0–0.19)
PLATELET # BLD AUTO: 422 K/UL (ref 130–400)
POTASSIUM SERPL-SCNC: 4.9 MMOL/L (ref 3.5–5.1)
PROT SERPL-MCNC: 7.9 G/DL (ref 6–8.3)
RBC # BLD AUTO: 4.6 MIL/UL (ref 4.5–6.2)
SODIUM SERPL-SCNC: 131 MMOL/L (ref 136–145)
WBC # BLD AUTO: 10.4 K/UL (ref 4.8–10.8)

## 2020-08-27 RX ADMIN — OXYCODONE HYDROCHLORIDE AND ACETAMINOPHEN SCH MG: 500 TABLET ORAL at 09:20

## 2020-08-27 RX ADMIN — SODIUM CHLORIDE SCH UNIT: 9 INJECTION, SOLUTION INTRAVENOUS at 12:11

## 2020-08-27 RX ADMIN — SODIUM CHLORIDE SCH UNIT: 9 INJECTION, SOLUTION INTRAVENOUS at 12:12

## 2020-08-27 RX ADMIN — APIXABAN SCH MG: 2.5 TABLET, FILM COATED ORAL at 09:20

## 2020-08-27 RX ADMIN — DEXAMETHASONE SODIUM PHOSPHATE SCH MG: 4 INJECTION, SOLUTION INTRAMUSCULAR; INTRAVENOUS at 00:10

## 2020-08-27 RX ADMIN — Medication SCH MG: at 09:20

## 2020-08-27 RX ADMIN — SODIUM CHLORIDE SCH UNIT: 9 INJECTION, SOLUTION INTRAVENOUS at 07:12

## 2020-08-27 RX ADMIN — FAMOTIDINE SCH MG: 10 INJECTION INTRAVENOUS at 09:20

## 2021-10-21 ENCOUNTER — HOSPITAL ENCOUNTER (EMERGENCY)
Dept: HOSPITAL 90 - EDH | Age: 46
Discharge: HOME | End: 2021-10-21
Payer: COMMERCIAL

## 2021-10-21 VITALS — DIASTOLIC BLOOD PRESSURE: 93 MMHG | SYSTOLIC BLOOD PRESSURE: 153 MMHG

## 2021-10-21 VITALS — BODY MASS INDEX: 35.21 KG/M2 | HEIGHT: 72 IN | WEIGHT: 259.93 LBS

## 2021-10-21 DIAGNOSIS — Z79.01: ICD-10-CM

## 2021-10-21 DIAGNOSIS — Z79.899: ICD-10-CM

## 2021-10-21 DIAGNOSIS — J22: Primary | ICD-10-CM

## 2021-10-21 DIAGNOSIS — Z79.84: ICD-10-CM

## 2021-10-21 DIAGNOSIS — E11.9: ICD-10-CM

## 2021-10-21 DIAGNOSIS — Z79.52: ICD-10-CM

## 2021-10-21 DIAGNOSIS — I10: ICD-10-CM

## 2021-12-19 ENCOUNTER — HOSPITAL ENCOUNTER (EMERGENCY)
Dept: HOSPITAL 90 - EDH | Age: 46
Discharge: HOME | End: 2021-12-19
Payer: SELF-PAY

## 2021-12-19 VITALS — BODY MASS INDEX: 37.94 KG/M2 | HEIGHT: 70 IN | WEIGHT: 265 LBS

## 2021-12-19 VITALS — DIASTOLIC BLOOD PRESSURE: 76 MMHG | SYSTOLIC BLOOD PRESSURE: 149 MMHG

## 2021-12-19 DIAGNOSIS — Z79.52: ICD-10-CM

## 2021-12-19 DIAGNOSIS — I10: ICD-10-CM

## 2021-12-19 DIAGNOSIS — E11.9: ICD-10-CM

## 2021-12-19 DIAGNOSIS — Z79.84: ICD-10-CM

## 2021-12-19 DIAGNOSIS — Z79.01: ICD-10-CM

## 2021-12-19 DIAGNOSIS — Z79.899: ICD-10-CM

## 2021-12-19 DIAGNOSIS — J40: Primary | ICD-10-CM

## 2021-12-19 PROCEDURE — 71046 X-RAY EXAM CHEST 2 VIEWS: CPT

## 2022-10-30 ENCOUNTER — HOSPITAL ENCOUNTER (EMERGENCY)
Dept: HOSPITAL 90 - EDH | Age: 47
Discharge: HOME | End: 2022-10-30
Payer: SELF-PAY

## 2022-10-30 VITALS — SYSTOLIC BLOOD PRESSURE: 134 MMHG | DIASTOLIC BLOOD PRESSURE: 84 MMHG

## 2022-10-30 VITALS — BODY MASS INDEX: 35.62 KG/M2 | HEIGHT: 72 IN | WEIGHT: 263.01 LBS

## 2022-10-30 DIAGNOSIS — Z79.84: ICD-10-CM

## 2022-10-30 DIAGNOSIS — Z79.899: ICD-10-CM

## 2022-10-30 DIAGNOSIS — K04.7: Primary | ICD-10-CM

## 2022-10-30 DIAGNOSIS — E11.9: ICD-10-CM

## 2022-10-30 DIAGNOSIS — I10: ICD-10-CM

## 2022-10-30 PROCEDURE — 99283 EMERGENCY DEPT VISIT LOW MDM: CPT

## 2022-10-30 PROCEDURE — 96372 THER/PROPH/DIAG INJ SC/IM: CPT
